# Patient Record
Sex: FEMALE | Race: AMERICAN INDIAN OR ALASKA NATIVE
[De-identification: names, ages, dates, MRNs, and addresses within clinical notes are randomized per-mention and may not be internally consistent; named-entity substitution may affect disease eponyms.]

---

## 2022-09-27 ENCOUNTER — HOSPITAL ENCOUNTER (EMERGENCY)
Dept: HOSPITAL 5 - ED | Age: 22
LOS: 1 days | Discharge: HOME | End: 2022-09-28
Payer: SELF-PAY

## 2022-09-27 DIAGNOSIS — F10.20: ICD-10-CM

## 2022-09-27 DIAGNOSIS — K64.9: ICD-10-CM

## 2022-09-27 DIAGNOSIS — K59.00: Primary | ICD-10-CM

## 2022-09-27 PROCEDURE — 99283 EMERGENCY DEPT VISIT LOW MDM: CPT

## 2022-09-27 PROCEDURE — 74018 RADEX ABDOMEN 1 VIEW: CPT

## 2022-09-28 VITALS — DIASTOLIC BLOOD PRESSURE: 77 MMHG | SYSTOLIC BLOOD PRESSURE: 115 MMHG

## 2022-09-28 NOTE — XRAY REPORT
ABDOMEN 1 VIEW 9/28/2022 4:52 AM



INDICATION / CLINICAL INFORMATION: Constipation.



COMPARISON: None available.



FINDINGS:



TUBES / LINES: None.

BOWEL GAS PATTERN: No significant abnormality. 

FREE AIR / EXTRALUMINAL GAS: None.



ADDITIONAL FINDINGS: No significant additional findings.



IMPRESSION:

1. No acute findings. 





Signer Name: Artur Carney MD 

Signed: 9/28/2022 5:55 AM

Workstation Name: [id number]

## 2022-09-28 NOTE — EMERGENCY DEPARTMENT REPORT
ED Abdominal Pain HPI





- General


Chief Complaint: Nausea/Vomiting/Diarrhea


Stated Complaint: DIARRHEA/HEMORRHOIDS


Time Seen by Provider: 09/28/22 07:38


Source: patient


Mode of arrival: Ambulatory


Limitations: No Limitations





- History of Present Illness


Initial Comments: 





Patient is a 21-year-old female that comes to the emergency room with 

constipation.  She states its been several weeks since she had a normal bowel 

movement.  She denies use of opiates.  She denies a history of the same.  She 

does not have pain in her abdomen per se but it is just she feels like she needs

to defecate.  She also has hemorrhoids.  Patient has no fever or chills.  No 

hypotension or tachycardia.  Patient has no nausea and vomiting.  No chest pain 

or shortness of breath.


MD Complaint: abdominal pain


-: Gradual, week(s)


Improves With: nothing


Worsens With: nothing


Associated Symptoms: denies other symptoms.  denies: nausea, vomiting, fever





- Related Data


                                  Previous Rx's











 Medication  Instructions  Recorded  Last Taken  Type


 


Docusate Sodium [Colace] 100 mg PO BID #60 capsule 09/28/22 Unknown Rx


 


Magnesium Citrate [Citrate of 300 ml PO NOW #1 bottle 09/28/22 Unknown Rx





Magnesia]    


 


Phenyleph/Pramoxin/Glycr/W.pet 1 appful RC BID #1 tube 09/28/22 Unknown Rx





[Preparation H Cream]    


 


Polyethylene Glycol 3350 [Miralax] 119 gm PO DAILY #30 dose 09/28/22 Unknown Rx


 


Senna Leaf Extract [Senna] 176 mg PO BID #60 dose 09/28/22 Unknown Rx











                                    Allergies











Allergy/AdvReac Type Severity Reaction Status Date / Time


 


No Known Allergies Allergy   Unverified 09/27/22 22:57














ED Review of Systems


ROS: 


Stated complaint: DIARRHEA/HEMORRHOIDS


Other details as noted in HPI





Comment: All other systems reviewed and negative





ED Past Medical Hx





- Past Medical History


Previous Medical History?: No





- Surgical History


Past Surgical History?: No





- Family History


Family history: no significant





- Social History


Smoking Status: Never Smoker


Substance Use Type: Alcohol





- Medications


Home Medications: 


                                Home Medications











 Medication  Instructions  Recorded  Confirmed  Last Taken  Type


 


Docusate Sodium [Colace] 100 mg PO BID #60 capsule 09/28/22  Unknown Rx


 


Magnesium Citrate [Citrate of 300 ml PO NOW #1 bottle 09/28/22  Unknown Rx





Magnesia]     


 


Phenyleph/Pramoxin/Glycr/W.pet 1 appful RC BID #1 tube 09/28/22  Unknown Rx





[Preparation H Cream]     


 


Polyethylene Glycol 3350 [Miralax] 119 gm PO DAILY #30 dose 09/28/22  Unknown Rx


 


Senna Leaf Extract [Senna] 176 mg PO BID #60 dose 09/28/22  Unknown Rx














ED Physical Exam





- General


Limitations: No Limitations


General appearance: alert, in no apparent distress





- Head


Head exam: Present: atraumatic, normocephalic





- Eye


Eye exam: Present: normal appearance





- ENT


ENT exam: Present: mucous membranes moist





- Neck


Neck exam: Present: normal inspection





- Respiratory


Respiratory exam: Present: normal lung sounds bilaterally.  Absent: respiratory 

distress





- Cardiovascular


Cardiovascular Exam: Present: regular rate, normal rhythm.  Absent: systolic 

murmur, diastolic murmur, rubs, gallop





- GI/Abdominal


GI/Abdominal exam: Present: soft, normal bowel sounds





- Extremities Exam


Extremities exam: Present: normal inspection





- Back Exam


Back exam: Present: normal inspection





- Neurological Exam


Neurological exam: Present: alert, oriented X3





- Psychiatric


Psychiatric exam: Present: normal affect, normal mood





- Skin


Skin exam: Present: warm, dry, intact, normal color.  Absent: rash





ED Course


                                   Vital Signs











  09/27/22





  22:50


 


Temperature 98.8 F


 


Pulse Rate 95 H


 


Respiratory 16





Rate 


 


Blood Pressure 106/82





[Right] 


 


O2 Sat by Pulse 97





Oximetry 














ED Medical Decision Making





- Radiology Data


Radiology results: report reviewed, image reviewed





See report





- Medical Decision Making








                                   Vital Signs











  09/27/22





  22:50


 


Temperature 98.8 F


 


Pulse Rate 95 H


 


Respiratory 16





Rate 


 


Blood Pressure 106/82





[Right] 


 


O2 Sat by Pulse 97





Oximetry 








Abdominal exam is benign.  Patient given lactulose for mag citrate is not 

available.  She is also been given an enema in the ER had minimal results.





Given her benign abdomen and no nausea vomiting or fever.  I am discharging her 

home with a bowel cocktail.  She will follow-up with PCP.  She has been advised 

on diet.





Patient being discharged home with discharge plan of care including diet, 

activity, medications and follow-up.  She verbalizes understanding of plan of 

care





- Differential Diagnosis


Constipation rule out obstruction


Critical care attestation.: 


If time is entered above; I have spent that time in minutes in the direct care 

of this critically ill patient, excluding procedure time.








ED Disposition


Clinical Impression: 


 Constipation, Hemorrhoids





Disposition: 01 HOME / SELF CARE / HOMELESS


Is pt being admited?: No


Does the pt Need Aspirin: No


Condition: Stable


Instructions:  Constipation, Adult, Hemorrhoids, Easy-to-Read


Additional Instructions: 


Stay well-hydrated with water





Avoid opiates





Avoid fried food and fast food





Medications as ordered today





Follow-up with primary care.  I have given you referral below


Referrals: 


THEODORA RICHARDS MD [Primary Care Provider] - 3-5 Days


Forms:  Work/School Release Form(ED), Accompanied Note


Time of Disposition: 10:12